# Patient Record
(demographics unavailable — no encounter records)

---

## 2024-11-13 NOTE — DISCUSSION/SUMMARY
[Reviewed Clinical Lab Test(s)] : Results of clinical tests were reviewed. [Discuss Alternatives/Risks/Benefits w/Patient] : All alternatives, risks, and benefits were discussed with the patient/family and all questions were answered.  Patient expressed good understanding and appreciates the importance of follow up as recommended. [Visit Time ___ Minutes] : [unfilled] minutes [Face to Face Time___ Minutes] : with [unfilled] minutes in face to face consultation. [Reviewed Radiology Report(s)] : Radiology reports were reviewed. [FreeTextEntry1] : 58yo w/ simple and complex hyperplasia, no atypia and EM Polyps. S/p D&C hsc with Mirena IUD, s/p bariatric surgery with excellent weight loss thus far. Most recent biopsy 11/2023 negative, patient without abnormal bleeding, plan embx and sono PRN new bleeding/ sxs. -more than 50% of visit spent face to face with patient counseling and coordinating care with mod level complexity -reviewed pathology and benign findings on recent biopsy. reviewed also pre-cancer condition and low risk for progression to cancer given med mgmt with Mirena IUD and now patient's weight loss. plan to rpt embx as needed based on bleeding pattern/ new pelvic symptoms. plan to image as needed based on new sxs. all questions answered and patient expressed understanding -rtc 6mths -pt to continue care with PCP and Dr. Robertson team -pain/fever/bleeding precautions given

## 2024-11-13 NOTE — HISTORY OF PRESENT ILLNESS
[FreeTextEntry1] : 56yo here for follow up  Dx: simple and complex hyperplasia, no atypia Tx: D&C/HSC Mirena IUD placement 11/14/22 Interval hx: Bariatric surgery (Dr. Robertson) 12/22/22  Since last visit patient reports doing well. she denies pain/fever/bleeding/bloating. She has normal activity tolerance and normal appetite. Reports normal urination and BMs. She has lost 130lbs to date, and feels that she has plateaued but continues to lose weight slowly. She is happy with her progress and feels well. she continues to follow with her PCP and Dr. Robertson.  Last embx 11/2023 was negative for hyperplasia or carcinoma. Plan for embx as needed based on if pt with new bleeding or pelvic pain sxs. Pt currently asxs.  Mammo: 7/2024 s/p biopsies Colonoscopy: done prior to bypass surgery and WNL  11/14/23: ENDOMETRIAL BIOPSY:  -Fragments of decidualized and fibrotic stroma and inactive endometrial glands, suggestive of progestin effect.  - Immunohistochemical studies with p53 is negative and Ki-67 is low, supporting above diagnosis. PAP:   -NILM, HPV negative  EMBx 4/2023: FINAL PATHOLOGIC DIAGNOSIS A. Endometrial biopsy:  - Fragments of benign endometrial tissue with decidual changes compatible with exogenous hormone therapy  - Minute fragments benign endocervical tissue, see comment.

## 2024-11-13 NOTE — HISTORY OF PRESENT ILLNESS
[FreeTextEntry1] : 58yo here for follow up  Dx: simple and complex hyperplasia, no atypia Tx: D&C/HSC Mirena IUD placement 11/14/22 Interval hx: Bariatric surgery (Dr. Rboertson) 12/22/22  Since last visit patient reports doing well. she denies pain/fever/bleeding/bloating. She has normal activity tolerance and normal appetite. Reports normal urination and BMs. She has lost 130lbs to date, and feels that she has plateaued but continues to lose weight slowly. She is happy with her progress and feels well. she continues to follow with her PCP and Dr. Robertson.  Last embx 11/2023 was negative for hyperplasia or carcinoma. Plan for embx as needed based on if pt with new bleeding or pelvic pain sxs. Pt currently asxs.  Mammo: 7/2024 s/p biopsies Colonoscopy: done prior to bypass surgery and WNL  11/14/23: ENDOMETRIAL BIOPSY:  -Fragments of decidualized and fibrotic stroma and inactive endometrial glands, suggestive of progestin effect.  - Immunohistochemical studies with p53 is negative and Ki-67 is low, supporting above diagnosis. PAP:   -NILM, HPV negative  EMBx 4/2023: FINAL PATHOLOGIC DIAGNOSIS A. Endometrial biopsy:  - Fragments of benign endometrial tissue with decidual changes compatible with exogenous hormone therapy  - Minute fragments benign endocervical tissue, see comment.

## 2025-05-20 NOTE — HISTORY OF PRESENT ILLNESS
[FreeTextEntry1] : 57yo here for follow up  Dx: simple and complex hyperplasia, no atypia Tx: D&C/HSC Mirena IUD placement 11/14/22 Interval hx: Bariatric surgery (Dr. Robertson) 12/22/22  Since last visit patient reports doing well. she denies pain/fever/bleeding/bloating. She has normal activity tolerance and normal appetite. Reports normal urination and BMs. She has lost 130lbs to date and feels that she has plateaued but continues to lose weight slowly. She is happy with her progress and feels well. Her goal weight is 170lbs and she still has 40lbs to go. We discussed pursuing difinitive surgery when she reaches her goal weight, but did discuss that she is at a much safer weight to pursue surgery at any time.  she continues to follow with her PCP and Dr. Robertson.  Last embx 11/2023 was negative for hyperplasia or carcinoma. Plan for embx as needed based on if pt with new bleeding or pelvic pain sxs. Pt currently asxs.  Mammo: 7/2024 s/p biopsies Colonoscopy: done prior to bypass surgery and WNL, due for s/u 2027 11/14/23: ENDOMETRIAL BIOPSY:  -Fragments of decidualized and fibrotic stroma and inactive endometrial glands, suggestive of progestin effect.  - Immunohistochemical studies with p53 is negative and Ki-67 is low, supporting above diagnosis. PAP:   -NILM, HPV negative  EMBx 4/2023: FINAL PATHOLOGIC DIAGNOSIS A. Endometrial biopsy:  - Fragments of benign endometrial tissue with decidual changes compatible with exogenous hormone therapy  - Minute fragments benign endocervical tissue, see comment.

## 2025-05-20 NOTE — DISCUSSION/SUMMARY
[Reviewed Clinical Lab Test(s)] : Results of clinical tests were reviewed. [Reviewed Radiology Report(s)] : Radiology reports were reviewed. [Discuss Alternatives/Risks/Benefits w/Patient] : All alternatives, risks, and benefits were discussed with the patient/family and all questions were answered.  Patient expressed good understanding and appreciates the importance of follow up as recommended. [Visit Time ___ Minutes] : [unfilled] minutes [Face to Face Time___ Minutes] : with [unfilled] minutes in face to face consultation. [FreeTextEntry1] : 57yo w/ simple and complex hyperplasia, no atypia and EM Polyps. S/p D&C hsc with Mirena IUD, s/p bariatric surgery with excellent weight loss thus far. Most recent biopsy 11/2023 negative, patient without abnormal bleeding, plan embx and sono PRN new bleeding/ sxs. -more than 50% of visit spent face to face with patient counseling and coordinating care with mod level complexity -reviewed pathology and benign findings previous biopsy. reviewed also pre-cancer condition and low risk for progression to cancer given med mgmt with Mirena IUD and now patient's weight loss. plan to rpt embx as needed based on bleeding pattern/ new pelvic symptoms. plan to image as needed based on new sxs. all questions answered and patient expressed understanding -pap/hpv today will call with results -rx for mammo/sono entered -rtc 6mths -pt to continue care with PCP and Dr. Robertson team -pain/fever/bleeding precautions given  d/w Dr Guido

## 2025-05-20 NOTE — PHYSICAL EXAM
[Fully active, able to carry on all pre-disease performance without restriction] : Status 0 - Fully active, able to carry on all pre-disease performance without restriction [RN] : RN [FreeTextEntry2] : Jessica Waite

## 2025-05-20 NOTE — HISTORY OF PRESENT ILLNESS
[FreeTextEntry1] : 59yo here for follow up  Dx: simple and complex hyperplasia, no atypia Tx: D&C/HSC Mirena IUD placement 11/14/22 Interval hx: Bariatric surgery (Dr. Robertson) 12/22/22  Since last visit patient reports doing well. she denies pain/fever/bleeding/bloating. She has normal activity tolerance and normal appetite. Reports normal urination and BMs. She has lost 130lbs to date and feels that she has plateaued but continues to lose weight slowly. She is happy with her progress and feels well. Her goal weight is 170lbs and she still has 40lbs to go. We discussed pursuing difinitive surgery when she reaches her goal weight, but did discuss that she is at a much safer weight to pursue surgery at any time.  she continues to follow with her PCP and Dr. Robertson.  Last embx 11/2023 was negative for hyperplasia or carcinoma. Plan for embx as needed based on if pt with new bleeding or pelvic pain sxs. Pt currently asxs.  Mammo: 7/2024 s/p biopsies Colonoscopy: done prior to bypass surgery and WNL, due for s/u 2027 11/14/23: ENDOMETRIAL BIOPSY:  -Fragments of decidualized and fibrotic stroma and inactive endometrial glands, suggestive of progestin effect.  - Immunohistochemical studies with p53 is negative and Ki-67 is low, supporting above diagnosis. PAP:   -NILM, HPV negative  EMBx 4/2023: FINAL PATHOLOGIC DIAGNOSIS A. Endometrial biopsy:  - Fragments of benign endometrial tissue with decidual changes compatible with exogenous hormone therapy  - Minute fragments benign endocervical tissue, see comment.

## 2025-05-20 NOTE — DISCUSSION/SUMMARY
[Reviewed Clinical Lab Test(s)] : Results of clinical tests were reviewed. [Reviewed Radiology Report(s)] : Radiology reports were reviewed. [Discuss Alternatives/Risks/Benefits w/Patient] : All alternatives, risks, and benefits were discussed with the patient/family and all questions were answered.  Patient expressed good understanding and appreciates the importance of follow up as recommended. [Visit Time ___ Minutes] : [unfilled] minutes [Face to Face Time___ Minutes] : with [unfilled] minutes in face to face consultation. [FreeTextEntry1] : 59yo w/ simple and complex hyperplasia, no atypia and EM Polyps. S/p D&C hsc with Mirena IUD, s/p bariatric surgery with excellent weight loss thus far. Most recent biopsy 11/2023 negative, patient without abnormal bleeding, plan embx and sono PRN new bleeding/ sxs. -more than 50% of visit spent face to face with patient counseling and coordinating care with mod level complexity -reviewed pathology and benign findings previous biopsy. reviewed also pre-cancer condition and low risk for progression to cancer given med mgmt with Mirena IUD and now patient's weight loss. plan to rpt embx as needed based on bleeding pattern/ new pelvic symptoms. plan to image as needed based on new sxs. all questions answered and patient expressed understanding -pap/hpv today will call with results -rx for mammo/sono entered -rtc 6mths -pt to continue care with PCP and Dr. Robertson team -pain/fever/bleeding precautions given  d/w Dr Guido